# Patient Record
Sex: FEMALE
[De-identification: names, ages, dates, MRNs, and addresses within clinical notes are randomized per-mention and may not be internally consistent; named-entity substitution may affect disease eponyms.]

---

## 2018-10-16 ENCOUNTER — HOSPITAL ENCOUNTER (EMERGENCY)
Dept: HOSPITAL 14 - H.ER | Age: 53
Discharge: HOME | End: 2018-10-16
Payer: COMMERCIAL

## 2018-10-16 VITALS
DIASTOLIC BLOOD PRESSURE: 90 MMHG | TEMPERATURE: 97.6 F | SYSTOLIC BLOOD PRESSURE: 150 MMHG | OXYGEN SATURATION: 98 % | HEART RATE: 70 BPM

## 2018-10-16 VITALS — RESPIRATION RATE: 18 BRPM

## 2018-10-16 VITALS — BODY MASS INDEX: 39.4 KG/M2

## 2018-10-16 DIAGNOSIS — R04.0: Primary | ICD-10-CM

## 2018-10-16 DIAGNOSIS — E11.65: ICD-10-CM

## 2018-10-16 DIAGNOSIS — I10: ICD-10-CM

## 2018-10-16 DIAGNOSIS — Z79.4: ICD-10-CM

## 2018-10-16 NOTE — ED PDOC
HPI: Nose Bleed


Time Seen by Provider: 10/16/18 08:25


Chief Complaint (Nursing): ENT Problem


Chief Complaint (Provider): Nose bleed


History Per: Patient


History/Exam Limitations: no limitations


Onset/Duration Of Symptoms: Days (today morning)


Current Symptoms Are (Timing): Gone Now


Additional Complaint(s): 





Pt. with nose bleed today morning when sitting at her desk.  No injury or 

projectile to nose.  Was only to the right nostril.  Stopped on its own when 

putting pressure on nose with ice.  Pt. here as she noted her bp to be high when

checked.  Pt. denies at any time, chest pain, dyspnea, weakness, headaches, 

dizziness.  Uses air conditioner at home.  Did not take her lisinopril today 

morning. 





Past Medical History


Reviewed: Nursing Documentation, Vital Signs


Vital Signs: 


                                Last Vital Signs











Temp  98.3 F   10/16/18 08:18


 


Pulse  69   10/16/18 08:18


 


Resp  19   10/16/18 08:18


 


BP  175/88 H  10/16/18 08:18


 


Pulse Ox  99   10/16/18 08:18














- Medical History


PMH: Diabetes, HTN


   Denies: Chronic Kidney Disease





- Surgical History


Surgical History: No Surg Hx





- Family History


Family History: States: Unknown Family Hx





- Living Arrangements


Living Arrangements: With Family





- Social History


Alcohol: None


Drugs: Denies





- Home Medications


Home Medications: 


                                Ambulatory Orders











 Medication  Instructions  Recorded


 


GlipiZIDE SR [Glucotrol XL] 10 mg PO BID 03/10/15


 


Aspirin [Ecotrin] 81 mg PO DAILY 03/10/17


 


Insulin Glargine, Recombina 22 unit SC DAILY 03/10/17





[Lantus]  


 


Lisinopril [Zestril] 5 mg PO DAILY 03/10/17


 


Nebivolol [Bystolic] 10 mg PO DAILY 03/10/17


 


Sitagliptin Phos/Metformin HCl 1 each PO BID 03/10/17





[Janumet 50-1,000 mg Tablet]  














- Allergies


Allergies/Adverse Reactions: 


                                    Allergies











Allergy/AdvReac Type Severity Reaction Status Date / Time


 


hydromorphone HCl Allergy  ITCHING Verified 10/16/18 08:20





[From Dilaudid]     


 


vancomycin Allergy  ITCHING Verified 10/16/18 08:20














Review of Systems


ROS Statement: Except As Marked, All Systems Reviewed And Found Negative





Physical Exam





- Reviewed


Nursing Documentation Reviewed: Yes


Vital Signs Reviewed: Yes





- Physical Exam


Appears: Positive for: Non-toxic, No Acute Distress


Head Exam: Positive for: ATRAUMATIC, NORMAL INSPECTION, NORMOCEPHALIC


Skin: Positive for: Normal Color, Warm, DRY


Eye Exam: Positive for: EOMI, Normal appearance, PERRL


ENT: Positive for: Normal ENT Inspection, Other (no oral post nasal blood drip; 

no nasal blood noted).  Negative for: Nasal Congestion, Pharyngeal Erythema


Neck: Positive for: Normal, Painless ROM, Supple


Cardiovascular/Chest: Positive for: Regular Rate, Rhythm


Respiratory: Positive for: CNT, Normal Breath Sounds


Gastrointestinal/Abdominal: Positive for: Normal Exam, Soft.  Negative for: 

Tenderness


Back: Positive for: Normal Inspection.  Negative for: L CVA Tenderness, R CVA 

Tenderness


Extremity: Positive for: Normal ROM.  Negative for: Tenderness


Neurologic/Psych: Positive for: Alert, Oriented





- ECG


O2 Sat by Pulse Oximetry: 99


Pulse Ox Interpretation: Normal





- Progress


ED Course And Treament: 





910:  Stable.  Spoke with [facility] resident.  States pt. is on 10mg lisinopril.  Will

give accordingly.  Pt. also did not take her diabetic meds and had breakfast.  

Sugar 329.  Metformin 1000 mg and glipizide 10 mg mornings and lantus 22 units 

at night, will give accordingly. 





1000:  Feels well.  AAOx3.  No nose bleeding.  Improved vitals and sugar.  Fu 

with pcp. 





Disposition





- Clinical Impression


Clinical Impression: 


 Epistaxis, Hyperglycemia, Hypertension








- Patient ED Disposition


Is Patient to be Admitted: No


Counseled Patient/Family Regarding: Studies Performed, Diagnosis, Need For 

Followup





- Disposition


Referrals: 


[facility] [Outside] - 10/17/18


Disposition: Routine/Home


Disposition Time: 09:30


Condition: STABLE


Instructions:  Nosebleeds, High Blood Pressure in Adults, Hyperglycemia, Adult


Forms:  [employer] (English), [facility] ED School/Work Excuse